# Patient Record
Sex: FEMALE | Race: WHITE | NOT HISPANIC OR LATINO | URBAN - METROPOLITAN AREA
[De-identification: names, ages, dates, MRNs, and addresses within clinical notes are randomized per-mention and may not be internally consistent; named-entity substitution may affect disease eponyms.]

---
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
## 2022-01-03 ENCOUNTER — EMERGENCY (EMERGENCY)
Facility: HOSPITAL | Age: 22
LOS: 1 days | Discharge: HOME | End: 2022-01-03
Attending: EMERGENCY MEDICINE | Admitting: EMERGENCY MEDICINE
Payer: COMMERCIAL

## 2022-01-03 VITALS
SYSTOLIC BLOOD PRESSURE: 113 MMHG | DIASTOLIC BLOOD PRESSURE: 80 MMHG | OXYGEN SATURATION: 97 % | WEIGHT: 100.09 LBS | HEART RATE: 120 BPM | RESPIRATION RATE: 16 BRPM | HEIGHT: 67 IN | TEMPERATURE: 98 F

## 2022-01-03 VITALS
SYSTOLIC BLOOD PRESSURE: 123 MMHG | TEMPERATURE: 98 F | DIASTOLIC BLOOD PRESSURE: 75 MMHG | HEART RATE: 101 BPM | OXYGEN SATURATION: 97 % | RESPIRATION RATE: 16 BRPM

## 2022-01-03 LAB
ALBUMIN SERPL ELPH-MCNC: 4 G/DL — SIGNIFICANT CHANGE UP (ref 3.3–5)
ALP SERPL-CCNC: 114 U/L — SIGNIFICANT CHANGE UP (ref 30–120)
ALT FLD-CCNC: 18 U/L DA — SIGNIFICANT CHANGE UP (ref 10–60)
ANION GAP SERPL CALC-SCNC: 12 MMOL/L — SIGNIFICANT CHANGE UP (ref 5–17)
AST SERPL-CCNC: 13 U/L — SIGNIFICANT CHANGE UP (ref 10–40)
BASOPHILS # BLD AUTO: 0.06 K/UL — SIGNIFICANT CHANGE UP (ref 0–0.2)
BASOPHILS NFR BLD AUTO: 0.5 % — SIGNIFICANT CHANGE UP (ref 0–2)
BILIRUB SERPL-MCNC: 0.3 MG/DL — SIGNIFICANT CHANGE UP (ref 0.2–1.2)
BUN SERPL-MCNC: 20 MG/DL — SIGNIFICANT CHANGE UP (ref 7–23)
CALCIUM SERPL-MCNC: 9.4 MG/DL — SIGNIFICANT CHANGE UP (ref 8.4–10.5)
CHLORIDE SERPL-SCNC: 100 MMOL/L — SIGNIFICANT CHANGE UP (ref 96–108)
CO2 SERPL-SCNC: 28 MMOL/L — SIGNIFICANT CHANGE UP (ref 22–31)
CREAT SERPL-MCNC: 0.83 MG/DL — SIGNIFICANT CHANGE UP (ref 0.5–1.3)
EOSINOPHIL # BLD AUTO: 0.01 K/UL — SIGNIFICANT CHANGE UP (ref 0–0.5)
EOSINOPHIL NFR BLD AUTO: 0.1 % — SIGNIFICANT CHANGE UP (ref 0–6)
GLUCOSE SERPL-MCNC: 91 MG/DL — SIGNIFICANT CHANGE UP (ref 70–99)
HCG UR QL: NEGATIVE — SIGNIFICANT CHANGE UP
HCT VFR BLD CALC: 40.6 % — SIGNIFICANT CHANGE UP (ref 34.5–45)
HGB BLD-MCNC: 13.4 G/DL — SIGNIFICANT CHANGE UP (ref 11.5–15.5)
IMM GRANULOCYTES NFR BLD AUTO: 0.3 % — SIGNIFICANT CHANGE UP (ref 0–1.5)
LIDOCAIN IGE QN: 66 U/L — LOW (ref 73–393)
LYMPHOCYTES # BLD AUTO: 1.04 K/UL — SIGNIFICANT CHANGE UP (ref 1–3.3)
LYMPHOCYTES # BLD AUTO: 8.9 % — LOW (ref 13–44)
MCHC RBC-ENTMCNC: 31.3 PG — SIGNIFICANT CHANGE UP (ref 27–34)
MCHC RBC-ENTMCNC: 33 GM/DL — SIGNIFICANT CHANGE UP (ref 32–36)
MCV RBC AUTO: 94.9 FL — SIGNIFICANT CHANGE UP (ref 80–100)
MONOCYTES # BLD AUTO: 0.91 K/UL — HIGH (ref 0–0.9)
MONOCYTES NFR BLD AUTO: 7.8 % — SIGNIFICANT CHANGE UP (ref 2–14)
NEUTROPHILS # BLD AUTO: 9.57 K/UL — HIGH (ref 1.8–7.4)
NEUTROPHILS NFR BLD AUTO: 82.4 % — HIGH (ref 43–77)
NRBC # BLD: 0 /100 WBCS — SIGNIFICANT CHANGE UP (ref 0–0)
PLATELET # BLD AUTO: 392 K/UL — SIGNIFICANT CHANGE UP (ref 150–400)
POTASSIUM SERPL-MCNC: 3.9 MMOL/L — SIGNIFICANT CHANGE UP (ref 3.5–5.3)
POTASSIUM SERPL-SCNC: 3.9 MMOL/L — SIGNIFICANT CHANGE UP (ref 3.5–5.3)
PROT SERPL-MCNC: 8.2 G/DL — SIGNIFICANT CHANGE UP (ref 6–8.3)
RBC # BLD: 4.28 M/UL — SIGNIFICANT CHANGE UP (ref 3.8–5.2)
RBC # FLD: 12.7 % — SIGNIFICANT CHANGE UP (ref 10.3–14.5)
SARS-COV-2 RNA SPEC QL NAA+PROBE: DETECTED
SODIUM SERPL-SCNC: 140 MMOL/L — SIGNIFICANT CHANGE UP (ref 135–145)
WBC # BLD: 11.63 K/UL — HIGH (ref 3.8–10.5)
WBC # FLD AUTO: 11.63 K/UL — HIGH (ref 3.8–10.5)

## 2022-01-03 PROCEDURE — 36415 COLL VENOUS BLD VENIPUNCTURE: CPT

## 2022-01-03 PROCEDURE — 99284 EMERGENCY DEPT VISIT MOD MDM: CPT

## 2022-01-03 PROCEDURE — 81025 URINE PREGNANCY TEST: CPT

## 2022-01-03 PROCEDURE — 99284 EMERGENCY DEPT VISIT MOD MDM: CPT | Mod: 25

## 2022-01-03 PROCEDURE — 96374 THER/PROPH/DIAG INJ IV PUSH: CPT

## 2022-01-03 PROCEDURE — 80053 COMPREHEN METABOLIC PANEL: CPT

## 2022-01-03 PROCEDURE — 83690 ASSAY OF LIPASE: CPT

## 2022-01-03 PROCEDURE — 85025 COMPLETE CBC W/AUTO DIFF WBC: CPT

## 2022-01-03 PROCEDURE — 87635 SARS-COV-2 COVID-19 AMP PRB: CPT

## 2022-01-03 RX ORDER — SODIUM CHLORIDE 9 MG/ML
1000 INJECTION INTRAMUSCULAR; INTRAVENOUS; SUBCUTANEOUS ONCE
Refills: 0 | Status: COMPLETED | OUTPATIENT
Start: 2022-01-03 | End: 2022-01-03

## 2022-01-03 RX ORDER — ONDANSETRON 8 MG/1
4 TABLET, FILM COATED ORAL ONCE
Refills: 0 | Status: COMPLETED | OUTPATIENT
Start: 2022-01-03 | End: 2022-01-03

## 2022-01-03 RX ORDER — ONDANSETRON 8 MG/1
1 TABLET, FILM COATED ORAL
Qty: 12 | Refills: 0
Start: 2022-01-03 | End: 2022-01-05

## 2022-01-03 RX ADMIN — ONDANSETRON 4 MILLIGRAM(S): 8 TABLET, FILM COATED ORAL at 17:43

## 2022-01-03 RX ADMIN — SODIUM CHLORIDE 1000 MILLILITER(S): 9 INJECTION INTRAMUSCULAR; INTRAVENOUS; SUBCUTANEOUS at 17:40

## 2022-01-03 RX ADMIN — SODIUM CHLORIDE 1000 MILLILITER(S): 9 INJECTION INTRAMUSCULAR; INTRAVENOUS; SUBCUTANEOUS at 17:30
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
## 2022-01-03 NOTE — ED PROVIDER NOTE - CARE PROVIDER_API CALL
Marga Mesa (DO)  Internal Medicine  [address] [place] [address]  Phone: [phone]  Fax: [phone]  Follow Up Time: 1-3 Days

## 2022-01-03 NOTE — ED PROVIDER NOTE - NSFOLLOWUPINSTRUCTIONS_ED_ALL_ED_FT
clear liquid diet next 8-12 hours, advance to BRAT diet as tolerated  zofran every 6 hours for nausea   follow up with primary care provider           Acute Nausea and Vomiting    WHAT YOU NEED TO KNOW:    Acute nausea and vomiting start suddenly, worsen quickly, and last a short time.    DISCHARGE INSTRUCTIONS:    Return to the emergency department if:   •You see blood in your vomit or your bowel movements.      •You have sudden, severe pain in your chest and upper abdomen after hard vomiting or retching.      •You have swelling in your neck and chest.       •You are dizzy, cold, and thirsty and your eyes and mouth are dry.      •You are urinating very little or not at all.      •You have muscle weakness, leg cramps, and trouble breathing.       •Your heart is beating much faster than normal.       •You continue to vomit for more than 48 hours.       Contact your healthcare provider if:   •You have frequent dry heaves (vomiting but nothing comes out).      •Your nausea and vomiting does not get better or go away after you use medicine.      •You have questions or concerns about your condition or treatment.      Medicines: You may need any of the following:   •Medicines may be given to calm your stomach and stop your vomiting. You may also need medicines to help you feel more relaxed or to stop nausea and vomiting caused by motion sickness.      •Gastrointestinal stimulants are used to help empty your stomach and bowels. This may help decrease nausea and vomiting.      •Take your medicine as directed. Contact your healthcare provider if you think your medicine is not helping or if you have side effects. Tell him or her if you are allergic to any medicine. Keep a list of the medicines, vitamins, and herbs you take. Include the amounts, and when and why you take them. Bring the list or the pill bottles to follow-up visits. Carry your medicine list with you in case of an emergency.      Prevent or manage acute nausea and vomiting:   •Do not drink alcohol. Alcohol may upset or irritate your stomach. Too much alcohol can also cause acute nausea and vomiting.      •Control stress. Headaches due to stress may cause nausea and vomiting. Find ways to relax and manage your stress. Get more rest and sleep.      •Drink more liquids as directed. Vomiting can lead to dehydration. It is important to drink more liquids to help replace lost body fluids. Ask your healthcare provider how much liquid to drink each day and which liquids are best for you. Your provider may recommend that you drink an oral rehydration solution (ORS). ORS contains water, salts, and sugar that are needed to replace the lost body fluids. Ask what kind of ORS to use, how much to drink, and where to get it.      •Eat smaller meals, more often. Eat small amounts of food every 2 to 3 hours, even if you are not hungry. Food in your stomach may decrease your nausea.      •Talk to your healthcare provider before you take over-the-counter (OTC) medicines. These medicines can cause serious problems if you use certain other medicines, or you have a medical condition. You may have problems if you use too much or use them for longer than the label says. Follow directions on the label carefully.       Follow up with your healthcare provider as directed: Write down your questions so you remember to ask them during your follow-up visits.

## 2022-01-03 NOTE — ED PROVIDER NOTE - PROGRESS NOTE DETAILS
Ino JORDAN for ED attending, Dr. Knight: 22 y/o F w/ PMHx of depression, anxiety, asthma presents to ED c/o vomiting for 3 days. Pt reports she has lost 22 pound in the last 3-4 days. Pt states she cant keep any food or water down. Pt endorses abd pain. Denies fever, possibility of pregnancy. No BM today, last one 3 days ago. Pt reports she never has had this issue before. Pt states she had a fever a week ago but had negative RVP and strep pcr. PCP: Dr. Ford. On exam: vital signs normal, afebrile and in no distress, heart and lungs normal, abd soft nontender, no mass or hsm, no rebound, extremities full ROM intact, skin warm dry no rash. Impression is vomiting. Plan labs IV fluids and Zofran. Reevaluated patient at bedside.  Patient feeling much improved.  no abd tenderness on repeat exam. taking po fluids. repeat heart rate 100.  Discussed the results of all diagnostic testing in ED and copies of all reports given.  supportive care discussed.  An opportunity to ask questions was given.  Discussed the importance of prompt, close medical follow-up.  Patient will return with any changes, concerns or persistent / worsening symptoms.  Understanding of all instructions verbalized.

## 2022-01-03 NOTE — ED ADULT NURSE NOTE - OBJECTIVE STATEMENT
C/O Nausea and vomiting x 4 days that re causing her stomach to cramps. States she lost 22 pounds in a week. Also states she had a fever last week higher 101.2F

## 2022-01-03 NOTE — ED PROVIDER NOTE - OBJECTIVE STATEMENT
21 year old female with history of depression, anxiety, and asthma presents with nausea and vomiting past 3 days. reports vomiting over 10-20 times a day. unable to keep anything down. reports mild abd cramping after vomiting, but denies current abd pain. no diarrhea. reports fever for 1 day last week. was tested for covid, flu, and strep which was negative. no fever since last week. no cough, chest pain, shortness of breath. no urinary complaints. no covid vaccinated. no known covid exposures   PCP Marga riggs

## 2022-01-03 NOTE — ED PROVIDER NOTE - CLINICAL SUMMARY MEDICAL DECISION MAKING FREE TEXT BOX
vomiting 2-3 days, unable to keep anything down. fever last week. tested negative last week for covid, influenza, and strep. no abd tenderness to suggest appendicitis, cholecystitis, diverticulitis. suspect viral in nature. other differentials include but not limited to dehydration, covid, pancreatitis, electrolyte abnormality will check labs, IVF, zofran, reassess

## 2022-01-03 NOTE — ED PROVIDER NOTE - PATIENT PORTAL LINK FT
You can access the FollowMyHealth Patient Portal offered by [facility] by registering at the following website: [email or web address]. By joining [employer]’s FollowMyHealth portal, you will also be able to view your health information using other applications (apps) compatible with our system.

## 2022-01-06 ENCOUNTER — EMERGENCY (EMERGENCY)
Facility: HOSPITAL | Age: 22
LOS: 1 days | Discharge: HOME | End: 2022-01-06
Attending: EMERGENCY MEDICINE | Admitting: EMERGENCY MEDICINE
Payer: COMMERCIAL

## 2022-01-06 VITALS
TEMPERATURE: 99 F | SYSTOLIC BLOOD PRESSURE: 114 MMHG | OXYGEN SATURATION: 98 % | HEART RATE: 99 BPM | DIASTOLIC BLOOD PRESSURE: 68 MMHG | RESPIRATION RATE: 20 BRPM

## 2022-01-06 VITALS
DIASTOLIC BLOOD PRESSURE: 85 MMHG | WEIGHT: 103.84 LBS | OXYGEN SATURATION: 96 % | TEMPERATURE: 98 F | RESPIRATION RATE: 18 BRPM | HEIGHT: 67 IN | SYSTOLIC BLOOD PRESSURE: 135 MMHG | HEART RATE: 106 BPM

## 2022-01-06 LAB
ALBUMIN SERPL ELPH-MCNC: 3.8 G/DL — SIGNIFICANT CHANGE UP (ref 3.3–5)
ALP SERPL-CCNC: 106 U/L — SIGNIFICANT CHANGE UP (ref 30–120)
ALT FLD-CCNC: 41 U/L DA — SIGNIFICANT CHANGE UP (ref 10–60)
ANION GAP SERPL CALC-SCNC: 14 MMOL/L — SIGNIFICANT CHANGE UP (ref 5–17)
APPEARANCE UR: ABNORMAL
AST SERPL-CCNC: 41 U/L — HIGH (ref 10–40)
BACTERIA # UR AUTO: ABNORMAL
BILIRUB SERPL-MCNC: 0.2 MG/DL — SIGNIFICANT CHANGE UP (ref 0.2–1.2)
BILIRUB UR-MCNC: ABNORMAL
BUN SERPL-MCNC: 11 MG/DL — SIGNIFICANT CHANGE UP (ref 7–23)
CALCIUM SERPL-MCNC: 9.2 MG/DL — SIGNIFICANT CHANGE UP (ref 8.4–10.5)
CHLORIDE SERPL-SCNC: 101 MMOL/L — SIGNIFICANT CHANGE UP (ref 96–108)
CO2 SERPL-SCNC: 26 MMOL/L — SIGNIFICANT CHANGE UP (ref 22–31)
COLOR SPEC: YELLOW — SIGNIFICANT CHANGE UP
CREAT SERPL-MCNC: 0.89 MG/DL — SIGNIFICANT CHANGE UP (ref 0.5–1.3)
DIFF PNL FLD: ABNORMAL
EPI CELLS # UR: ABNORMAL
GLUCOSE SERPL-MCNC: 103 MG/DL — HIGH (ref 70–99)
GLUCOSE UR QL: NEGATIVE MG/DL — SIGNIFICANT CHANGE UP
HCG UR QL: NEGATIVE — SIGNIFICANT CHANGE UP
HCT VFR BLD CALC: 43 % — SIGNIFICANT CHANGE UP (ref 34.5–45)
HGB BLD-MCNC: 14.1 G/DL — SIGNIFICANT CHANGE UP (ref 11.5–15.5)
KETONES UR-MCNC: ABNORMAL
LEUKOCYTE ESTERASE UR-ACNC: ABNORMAL
LIDOCAIN IGE QN: 120 U/L — SIGNIFICANT CHANGE UP (ref 73–393)
MCHC RBC-ENTMCNC: 30.9 PG — SIGNIFICANT CHANGE UP (ref 27–34)
MCHC RBC-ENTMCNC: 32.8 GM/DL — SIGNIFICANT CHANGE UP (ref 32–36)
MCV RBC AUTO: 94.1 FL — SIGNIFICANT CHANGE UP (ref 80–100)
NITRITE UR-MCNC: NEGATIVE — SIGNIFICANT CHANGE UP
NRBC # BLD: 0 /100 WBCS — SIGNIFICANT CHANGE UP (ref 0–0)
PH UR: 5 — SIGNIFICANT CHANGE UP (ref 5–8)
PLATELET # BLD AUTO: 353 K/UL — SIGNIFICANT CHANGE UP (ref 150–400)
POTASSIUM SERPL-MCNC: 3.4 MMOL/L — LOW (ref 3.5–5.3)
POTASSIUM SERPL-SCNC: 3.4 MMOL/L — LOW (ref 3.5–5.3)
PROT SERPL-MCNC: 7.8 G/DL — SIGNIFICANT CHANGE UP (ref 6–8.3)
PROT UR-MCNC: 100 MG/DL
RBC # BLD: 4.57 M/UL — SIGNIFICANT CHANGE UP (ref 3.8–5.2)
RBC # FLD: 12.8 % — SIGNIFICANT CHANGE UP (ref 10.3–14.5)
RBC CASTS # UR COMP ASSIST: SIGNIFICANT CHANGE UP /HPF (ref 0–4)
SODIUM SERPL-SCNC: 141 MMOL/L — SIGNIFICANT CHANGE UP (ref 135–145)
SP GR SPEC: 1.02 — SIGNIFICANT CHANGE UP (ref 1.01–1.02)
UROBILINOGEN FLD QL: 1 MG/DL
WBC # BLD: 8.5 K/UL — SIGNIFICANT CHANGE UP (ref 3.8–10.5)
WBC # FLD AUTO: 8.5 K/UL — SIGNIFICANT CHANGE UP (ref 3.8–10.5)
WBC UR QL: SIGNIFICANT CHANGE UP

## 2022-01-06 PROCEDURE — 99284 EMERGENCY DEPT VISIT MOD MDM: CPT

## 2022-01-06 PROCEDURE — 80053 COMPREHEN METABOLIC PANEL: CPT

## 2022-01-06 PROCEDURE — 99283 EMERGENCY DEPT VISIT LOW MDM: CPT

## 2022-01-06 PROCEDURE — 85027 COMPLETE CBC AUTOMATED: CPT

## 2022-01-06 PROCEDURE — 83690 ASSAY OF LIPASE: CPT

## 2022-01-06 PROCEDURE — 36415 COLL VENOUS BLD VENIPUNCTURE: CPT

## 2022-01-06 PROCEDURE — 81001 URINALYSIS AUTO W/SCOPE: CPT

## 2022-01-06 PROCEDURE — 81025 URINE PREGNANCY TEST: CPT

## 2022-01-06 RX ORDER — LAMOTRIGINE 25 MG/1
0 TABLET, ORALLY DISINTEGRATING ORAL
Qty: 0 | Refills: 0 | DISCHARGE

## 2022-01-06 RX ORDER — QUETIAPINE FUMARATE 200 MG/1
1 TABLET, FILM COATED ORAL
Qty: 0 | Refills: 0 | DISCHARGE

## 2022-01-06 RX ORDER — SERTRALINE 25 MG/1
1 TABLET, FILM COATED ORAL
Qty: 0 | Refills: 0 | DISCHARGE

## 2022-01-06 RX ORDER — QUETIAPINE FUMARATE 200 MG/1
0 TABLET, FILM COATED ORAL
Qty: 0 | Refills: 0 | DISCHARGE

## 2022-01-06 RX ORDER — SERTRALINE 25 MG/1
150 TABLET, FILM COATED ORAL
Qty: 0 | Refills: 0 | DISCHARGE

## 2022-01-06 RX ORDER — SODIUM CHLORIDE 9 MG/ML
1000 INJECTION INTRAMUSCULAR; INTRAVENOUS; SUBCUTANEOUS ONCE
Refills: 0 | Status: COMPLETED | OUTPATIENT
Start: 2022-01-06 | End: 2022-01-06

## 2022-01-06 RX ADMIN — SODIUM CHLORIDE 1000 MILLILITER(S): 9 INJECTION INTRAMUSCULAR; INTRAVENOUS; SUBCUTANEOUS at 04:20

## 2022-01-06 NOTE — ED PROVIDER NOTE - PATIENT PORTAL LINK FT
You can access the FollowMyHealth Patient Portal offered by [facility] by registering at the following website: [email or web address]. By joining [employer]’s FollowMyHealth portal, you will also be able to view your health information using other applications (apps) compatible with our system.

## 2022-01-06 NOTE — ED PROVIDER NOTE - OBJECTIVE STATEMENT
Patient seen here 2-3 days ago with n/v/d. Diagnosed with covid, Treated and released with zofran as out-patient. Now states the n/v/d have continued. She states she is unable to keep anything down, and feels like she could pass out. Using zofran po at home, but not helping. Some cough. No SOB. No urinary symptoms

## 2022-01-06 NOTE — ED PROVIDER NOTE - NSFOLLOWUPINSTRUCTIONS_ED_ALL_ED_FT
COVID-19 (Coronavirus Disease 2019)    WHAT YOU NEED TO KNOW:    What do I need to know about COVID-19? COVID-19 is the disease caused by a coronavirus first discovered in December 2019. Coronaviruses generally cause upper respiratory (nose, throat, and lung) infections, such as a cold. The 2019 virus spreads quickly and easily. It can be spread starting 2 to 3 days before symptoms even begin.    What do I need to know about variants? The virus has changed into several new forms (called variants) since it was discovered. The variants may be more contagious (easily spread) than the original form. Some may also cause more severe illness than others.    What are the signs and symptoms of COVID-19? You may not develop any signs or symptoms. Signs and symptoms usually start about 5 days after infection but can take 2 to 14 days. You may feel like you have the flu or a bad cold. Some signs and symptoms go away in a few days. Others can last weeks, months, or possibly years. You may have any of the following:   •A cough      •Shortness of breath or trouble breathing that may become severe      •A fever      •Chills that might include shaking      •Muscle pain, body aches, or a headache      •A sore throat      •Sudden changes or loss of your taste or smell      •Feeling mentally and physically tired (fatigue)      •Congestion (stuffy head and nose), or a runny nose      •Diarrhea, nausea, or vomiting      How is COVID-19 diagnosed? Testing is offered at many sites. You may need to quarantine until you get your results. Any of the following tests may be used:   •A viral PCR test shows if you have a current infection. A sample is taken from your nose or throat with a swab. You may need to wait 1 or more days to get the test results.       •An antigen test shows if you have a protein from the COVID-19 virus. This test is often called a rapid test because the results can be available in 30 minutes or less.      •An antibody test shows if you had a recent or past infection. Blood samples are used for this test. Antibodies are made by your immune system to fight the virus that causes COVID-19. Antibodies form 1 to 3 weeks after you are infected. This test is not used to show if you are immune to the virus.       •A CT, MRI, ultrasound, or x-ray may be used to check for complications of COVID-19. These may include pneumonia, blood clots, or other complications.      How is COVID-19 treated?   •Mild symptoms may get better on their own. Some treatments have emergency use authorization (EUA). Examples include monoclonal antibodies and convalescent plasma. These may be given to help prevent worsening of your symptoms. You may also need any of the following:?Decongestants help reduce nasal congestion and help you breathe more easily. If you take decongestant pills, they may make you feel restless or cause problems with your sleep. Do not use decongestant sprays for more than a few days.      ?Cough suppressants help reduce coughing. Ask your healthcare provider which type of cough medicine is best for you.      ?To soothe a sore throat, gargle with warm salt water, or use throat lozenges or a throat spray. Drink more liquids to thin and loosen mucus and to prevent dehydration.      ?NSAIDs or acetaminophen can help lower a fever and relieve body aches or a headache. Follow directions. If not taken correctly, NSAIDs can cause kidney damage and acetaminophen can cause liver damage.      •Severe or life-threatening symptoms are treated in the hospital. You may need any of the following: ?Medicines may be given to fight the virus or treat inflammation.      ?Blood thinners help prevent or treat blood clots. If you have a deep vein thrombosis (DVT) or pulmonary embolism (PE), you may need to use blood thinners for at least 3 months.      ?Extra oxygen may be given if you have respiratory failure. This means your lungs cannot get enough oxygen into your blood and out to your organs.      ?A ventilator may be used to help you breathe.        What do I need to know about health problems the virus may cause? You may develop long-term health problems caused by the virus. Your risk is higher if you are 65 or older. A weak immune system, obesity, diabetes, chronic kidney disease, or a heart or lung condition can also increase your risk. Your risk is also higher if you are a current or former cigarette smoker. COVID-19 can lead to any of the following:  •Multisymptom inflammatory syndrome in adults (MIS-A) or in children (MIS-C), causing inflammation in the heart, digestive system, skin, or brain      •Shortness of breath, serious lower respiratory conditions, such as pneumonia or acute respiratory distress syndrome (ARDS)      •Blood clots or blood vessel damage      •Organ damage from a lack of oxygen or from blood clots      •Sleep problems      •Problems thinking clearly, remembering information, or concentrating      •Mood changes, depression, or anxiety      •Long-term problems tasting or smelling      •Loss of appetite and weight loss      •Nerve pain      •Fatigue (feeling mentally and physically tired)      How does the 2019 coronavirus spread?   •Droplets are the main way all coronaviruses spread. The virus travels in droplets that form when a person talks, sings, coughs, or sneezes. The droplets can also float in the air for minutes or hours. Infection happens when you breathe in the droplets or get them in your eyes or nose. Close personal contact with an infected person increases your risk for infection. This means being within 6 feet (2 meters) of the person for at least 15 minutes over 24 hours.      •Person-to-person contact can spread the virus. For example, a person with the virus on his or her hands can spread it by shaking hands with someone.      •The virus can stay on objects and surfaces for up to 3 days. You may become infected by touching the object or surface and then touching your eyes or mouth.      What do I need to know about COVID-19 vaccines? Healthcare providers recommend a COVID-19 vaccine, even if you have already had COVID-19. You are considered fully vaccinated against COVID-19 two weeks after the final dose of any COVID-19 vaccine. Let your healthcare provider know when you have received the final dose of the vaccine. Make a copy of your vaccination card. Keep the original with you in case you need to show it. Keep the copy in a safe place.  •COVID-19 vaccines are given as a shot in 1 or 2 doses.   Vaccination is recommended for everyone 5 years or older. One 2-dose vaccine is fully approved for those 16 or older. This vaccine also has an emergency use authorization (EUA) for children 5 to 15 years old. No vaccine is currently available for children younger than 5 years. An additional (booster) dose is also recommended for all adults 18 or older. The booster can be a different brand of the COVID-19 vaccine than you originally received. The timing for the booster depends on the type of vaccine you received:?1-dose vaccine: The booster is given at least 2 months after you received the vaccine.      ?2-dose vaccine: The booster is given at least 6 months after the second dose.    COVID-19 Immunization Schedule         •Continue social distancing and other measures, even after you get the vaccine. Although it is not common, you can become infected after you get the vaccine. You may also be able to pass the virus to others without knowing you are infected.      •After you get the vaccine, check local, national, and international travel rules. You may need to be tested before you travel. Some countries require proof of a negative test before you travel. You may also need to quarantine after you return.      How can I help lower the risk for COVID-19?   •Wash your hands often throughout the day. Use soap and water. Rub your soapy hands together, lacing your fingers, for at least 20 seconds. Rinse with warm, running water. Dry your hands with a clean towel or paper towel. Use hand  that contains alcohol if soap and water are not available. Teach children how to wash their hands and use hand .  Handwashing           •Cover sneezes and coughs. Turn your face away and cover your mouth and nose with a tissue. Throw the tissue away. Use the bend of your arm if a tissue is not available. Then wash your hands well with soap and water or use hand . Teach children how to cover a cough or sneeze.      •Wear a face covering (mask) when needed. Use a cloth covering with at least 2 layers. You can also create layers by putting a cloth covering over a disposable non-medical mask. Cover your mouth and your nose.  How to Wear a Face Covering (Mask)           •Follow worldwide, national, and local social distancing guidelines. Keep at least 6 feet (2 meters) between you and others.      •Try not to touch your face. If you get the virus on your hands, you can transfer it to your eyes, nose, or mouth and become infected. You can also transfer it to objects, surfaces, or people.      •Clean and disinfect high-touch surfaces and objects often. Use disinfecting wipes, or make a solution of 4 teaspoons of bleach in 1 quart (4 cups) of water.      •Ask about other vaccines you may need. Get the influenza (flu) vaccine as soon as recommended each year, usually starting in September or October. Get the pneumonia vaccine if recommended. Your healthcare provider can tell you if you should also get other vaccines, and when to get them.    Prevent COVID-19 Infection         How do I follow social distancing guidelines to help lower the risk for COVID-19? National and local social distancing rules vary. Rules and restrictions may change over time as restrictions are lifted. The following are general guidelines:   •Stay home if you are sick or think you may have COVID-19. It is important to stay home if you are waiting for a testing appointment or for test results.      •Avoid close physical contact with anyone who does not live in your home. Do not shake hands with, hug, or kiss a person as a greeting. If you must use public transportation (such as a bus or subway), try to sit or stand away from others. Wear your face covering.      •Avoid in-person gatherings and crowds. Attend virtually if possible.      Where can I find more information?   •Centers for Disease Control and Prevention  [address] [place] [address]  Phone: 1-718.390.3789  Web Address: http://www.cdc.gov        Call your local emergency number (911 in the [place]) if:   •You have trouble breathing or shortness of breath at rest.      •You have chest pain or pressure that lasts longer than 5 minutes.      •You become confused or hard to wake.      •Your lips or face are blue.      When should I seek immediate care?   •You have a fever of 104°F (40°C) or higher.          When should I call my doctor?   •You have symptoms of COVID-19.      •You have questions or concerns about your condition or care.      CARE AGREEMENT:    You have the right to help plan your care. Learn about your health condition and how it may be treated. Discuss treatment options with your healthcare providers to decide what care you want to receive. You always have the right to refuse treatment.

## 2022-01-06 NOTE — ED ADULT NURSE NOTE - OBJECTIVE STATEMENT
pt reports she was diagnosed 3 days ago with COVID. has been having n/v/d for the past 3 days. unable to keep food down. feels dehydrated

## 2022-01-06 NOTE — ED ADULT NURSE NOTE - NSICDXPASTMEDICALHX_GEN_ALL_CORE_FT
PAST MEDICAL HISTORY:  Anxiety     Asthma     Depression     H/O bipolar disorder     Schizophrenia, schizo-affective

## 2022-02-08 NOTE — ED PROVIDER NOTE - EYES, MLM
Patient was seen and evaluated at bedside. No acute events overnight. Pt offers no new complaints at this time. Underwent HD and Permacath removal yesterday. VS reviewed.    Vital Signs Last 24 Hrs  T(C): 36.9 (07 Feb 2022 19:46), Max: 37.5 (07 Feb 2022 16:46)  T(F): 98.5 (07 Feb 2022 19:46), Max: 99.5 (07 Feb 2022 16:46)  HR: 108 (07 Feb 2022 19:46) (89 - 110)  BP: 126/87 (07 Feb 2022 19:46) (126/83 - 139/88)  BP(mean): --  RR: 18 (07 Feb 2022 19:46) (16 - 18)  SpO2: 100% (07 Feb 2022 19:46) (97% - 100%)    Physical Exam:  General: AAOx3, in NAD  HEENT: NC/AT, EOMI, poor dentition  Cardio: S1S2, tachycardic  Pulm: equal air entry b/l, non labored  Chest: dressing over right chest c/d/i  Abdomen: soft, NT/ND  Extremities: multiple excoriations over all extremities    I&O's Summary    I&O's Detail      MEDICATIONS  (STANDING):  apixaban 2.5 milliGRAM(s) Oral two times a day  artificial tears (preservative free) Ophthalmic Solution 1 Drop(s) Both EYES two times a day  atovaquone  Suspension 1500 milliGRAM(s) Oral daily  collagenase Ointment 1 Application(s) Topical daily  epoetin amee-epbx (RETACRIT) Injectable 81981 Unit(s) IV Push <User Schedule>  gabapentin 100 milliGRAM(s) Oral three times a day  hydroxychloroquine 200 milliGRAM(s) Oral daily  levETIRAcetam 250 milliGRAM(s) Oral <User Schedule>  levETIRAcetam 500 milliGRAM(s) Oral daily  metoprolol tartrate 12.5 milliGRAM(s) Oral two times a day  pantoprazole    Tablet 40 milliGRAM(s) Oral before breakfast  sevelamer carbonate 800 milliGRAM(s) Oral three times a day with meals  silver sulfADIAZINE 1% Cream 1 Application(s) Topical two times a day    MEDICATIONS  (PRN):  acetaminophen     Tablet .. 650 milliGRAM(s) Oral every 6 hours PRN Temp greater or equal to 38.5C (101.3F), Mild Pain (1 - 3)  albumin human 25% IVPB 50 milliLiter(s) IV Intermittent every 1 hour PRN SBP less than 100  ALBUTerol    90 MICROgram(s) HFA Inhaler 2 Puff(s) Inhalation every 6 hours PRN Shortness of Breath and/or Wheezing  diphenhydrAMINE 25 milliGRAM(s) Oral every 6 hours PRN Rash and/or Itching  loperamide 2 milliGRAM(s) Oral every 6 hours PRN Diarrhea  melatonin 3 milliGRAM(s) Oral at bedtime PRN Insomnia  senna 2 Tablet(s) Oral at bedtime PRN Constipation      LABS:                        7.5    10.77 )-----------( 174      ( 07 Feb 2022 11:57 )             23.4     02-07    130<L>  |  99  |  18  ----------------------------<  84  3.1<L>   |  20<L>  |  4.88<H>    Ca    8.5      07 Feb 2022 07:49            RADIOLOGY & ADDITIONAL STUDIES: Clear bilaterally, pupils equal, round and reactive to light.

## 2022-09-23 ENCOUNTER — NON-APPOINTMENT (OUTPATIENT)
Age: 22
End: 2022-09-23

## 2022-10-24 NOTE — ED ADULT NURSE NOTE - RESPIRATION RHYTHM, QM
10/24/2022    Patient: Jorge Glover   YOB: 1942   Date of Visit: 10/24/2022     Jeff Cain MD  [address] [place] [address]  [facility] [phone]  Via Fax: [phone]    Dear Jeff Cain MD,      Thank you for referring Mr. Jorge Glover to [facility] for evaluation. My notes for this consultation are attached. If you have questions, please do not hesitate to call me. I look forward to following your patient along with you.       Sincerely,    Wally Cruz MD regular

## 2022-11-15 ENCOUNTER — NON-APPOINTMENT (OUTPATIENT)
Age: 22
End: 2022-11-15

## 2022-11-16 ENCOUNTER — EMERGENCY (EMERGENCY)
Facility: HOSPITAL | Age: 22
LOS: 1 days | Discharge: LEFT BEFORE DISCHARGE | End: 2022-11-16
Admitting: EMERGENCY MEDICINE

## 2022-11-16 VITALS
WEIGHT: 119.93 LBS | SYSTOLIC BLOOD PRESSURE: 110 MMHG | DIASTOLIC BLOOD PRESSURE: 76 MMHG | HEIGHT: 67 IN | RESPIRATION RATE: 18 BRPM | HEART RATE: 102 BPM | TEMPERATURE: 98 F | OXYGEN SATURATION: 99 %

## 2022-11-16 VITALS
SYSTOLIC BLOOD PRESSURE: 112 MMHG | HEART RATE: 88 BPM | OXYGEN SATURATION: 99 % | DIASTOLIC BLOOD PRESSURE: 74 MMHG | RESPIRATION RATE: 18 BRPM

## 2022-11-16 PROCEDURE — L9992: CPT

## 2022-11-17 PROBLEM — F25.9 SCHIZOAFFECTIVE DISORDER, UNSPECIFIED: Chronic | Status: ACTIVE | Noted: 2022-01-06

## 2022-11-17 PROBLEM — Z86.59 PERSONAL HISTORY OF OTHER MENTAL AND BEHAVIORAL DISORDERS: Chronic | Status: ACTIVE | Noted: 2022-01-06

## 2022-11-21 ENCOUNTER — EMERGENCY (EMERGENCY)
Facility: HOSPITAL | Age: 22
LOS: 1 days | Discharge: HOME | End: 2022-11-21
Attending: EMERGENCY MEDICINE | Admitting: EMERGENCY MEDICINE
Payer: COMMERCIAL

## 2022-11-21 VITALS
SYSTOLIC BLOOD PRESSURE: 119 MMHG | OXYGEN SATURATION: 99 % | DIASTOLIC BLOOD PRESSURE: 77 MMHG | HEIGHT: 67 IN | TEMPERATURE: 98 F | WEIGHT: 125 LBS | HEART RATE: 104 BPM | RESPIRATION RATE: 18 BRPM

## 2022-11-21 VITALS
TEMPERATURE: 98 F | OXYGEN SATURATION: 99 % | RESPIRATION RATE: 16 BRPM | SYSTOLIC BLOOD PRESSURE: 110 MMHG | DIASTOLIC BLOOD PRESSURE: 81 MMHG | HEART RATE: 88 BPM

## 2022-11-21 LAB
ALBUMIN SERPL ELPH-MCNC: 3.9 G/DL — SIGNIFICANT CHANGE UP (ref 3.3–5)
ALP SERPL-CCNC: 79 U/L — SIGNIFICANT CHANGE UP (ref 40–120)
ALT FLD-CCNC: 48 U/L — SIGNIFICANT CHANGE UP (ref 12–78)
ANION GAP SERPL CALC-SCNC: 7 MMOL/L — SIGNIFICANT CHANGE UP (ref 5–17)
AST SERPL-CCNC: 33 U/L — SIGNIFICANT CHANGE UP (ref 15–37)
BASOPHILS # BLD AUTO: 0.03 K/UL — SIGNIFICANT CHANGE UP (ref 0–0.2)
BASOPHILS NFR BLD AUTO: 0.7 % — SIGNIFICANT CHANGE UP (ref 0–2)
BILIRUB SERPL-MCNC: 0.4 MG/DL — SIGNIFICANT CHANGE UP (ref 0.2–1.2)
BUN SERPL-MCNC: 11 MG/DL — SIGNIFICANT CHANGE UP (ref 7–23)
CALCIUM SERPL-MCNC: 9.5 MG/DL — SIGNIFICANT CHANGE UP (ref 8.5–10.1)
CHLORIDE SERPL-SCNC: 107 MMOL/L — SIGNIFICANT CHANGE UP (ref 96–108)
CO2 SERPL-SCNC: 29 MMOL/L — SIGNIFICANT CHANGE UP (ref 22–31)
CREAT SERPL-MCNC: 0.87 MG/DL — SIGNIFICANT CHANGE UP (ref 0.5–1.3)
D DIMER BLD IA.RAPID-MCNC: <150 NG/ML DDU — SIGNIFICANT CHANGE UP
EGFR: 98 ML/MIN/1.73M2 — SIGNIFICANT CHANGE UP
EOSINOPHIL # BLD AUTO: 0.14 K/UL — SIGNIFICANT CHANGE UP (ref 0–0.5)
EOSINOPHIL NFR BLD AUTO: 3.5 % — SIGNIFICANT CHANGE UP (ref 0–6)
GLUCOSE SERPL-MCNC: 87 MG/DL — SIGNIFICANT CHANGE UP (ref 70–99)
HCT VFR BLD CALC: 40.5 % — SIGNIFICANT CHANGE UP (ref 34.5–45)
HGB BLD-MCNC: 13.4 G/DL — SIGNIFICANT CHANGE UP (ref 11.5–15.5)
IMM GRANULOCYTES NFR BLD AUTO: 0.2 % — SIGNIFICANT CHANGE UP (ref 0–0.9)
LYMPHOCYTES # BLD AUTO: 1.59 K/UL — SIGNIFICANT CHANGE UP (ref 1–3.3)
LYMPHOCYTES # BLD AUTO: 39.3 % — SIGNIFICANT CHANGE UP (ref 13–44)
MCHC RBC-ENTMCNC: 30.7 PG — SIGNIFICANT CHANGE UP (ref 27–34)
MCHC RBC-ENTMCNC: 33.1 GM/DL — SIGNIFICANT CHANGE UP (ref 32–36)
MCV RBC AUTO: 92.7 FL — SIGNIFICANT CHANGE UP (ref 80–100)
MONOCYTES # BLD AUTO: 0.4 K/UL — SIGNIFICANT CHANGE UP (ref 0–0.9)
MONOCYTES NFR BLD AUTO: 9.9 % — SIGNIFICANT CHANGE UP (ref 2–14)
NEUTROPHILS # BLD AUTO: 1.88 K/UL — SIGNIFICANT CHANGE UP (ref 1.8–7.4)
NEUTROPHILS NFR BLD AUTO: 46.4 % — SIGNIFICANT CHANGE UP (ref 43–77)
NRBC # BLD: 0 /100 WBCS — SIGNIFICANT CHANGE UP (ref 0–0)
PLATELET # BLD AUTO: 240 K/UL — SIGNIFICANT CHANGE UP (ref 150–400)
POTASSIUM SERPL-MCNC: 3.6 MMOL/L — SIGNIFICANT CHANGE UP (ref 3.5–5.3)
POTASSIUM SERPL-SCNC: 3.6 MMOL/L — SIGNIFICANT CHANGE UP (ref 3.5–5.3)
PROT SERPL-MCNC: 7 G/DL — SIGNIFICANT CHANGE UP (ref 6–8.3)
RBC # BLD: 4.37 M/UL — SIGNIFICANT CHANGE UP (ref 3.8–5.2)
RBC # FLD: 12.3 % — SIGNIFICANT CHANGE UP (ref 10.3–14.5)
SODIUM SERPL-SCNC: 143 MMOL/L — SIGNIFICANT CHANGE UP (ref 135–145)
T4 AB SER-ACNC: 4.7 UG/DL — SIGNIFICANT CHANGE UP (ref 4.6–12)
TSH SERPL-MCNC: 3.43 UIU/ML — SIGNIFICANT CHANGE UP (ref 0.36–3.74)
WBC # BLD: 4.05 K/UL — SIGNIFICANT CHANGE UP (ref 3.8–10.5)
WBC # FLD AUTO: 4.05 K/UL — SIGNIFICANT CHANGE UP (ref 3.8–10.5)

## 2022-11-21 PROCEDURE — 85025 COMPLETE CBC W/AUTO DIFF WBC: CPT

## 2022-11-21 PROCEDURE — 85379 FIBRIN DEGRADATION QUANT: CPT

## 2022-11-21 PROCEDURE — 71045 X-RAY EXAM CHEST 1 VIEW: CPT

## 2022-11-21 PROCEDURE — 93010 ELECTROCARDIOGRAM REPORT: CPT

## 2022-11-21 PROCEDURE — 36415 COLL VENOUS BLD VENIPUNCTURE: CPT

## 2022-11-21 PROCEDURE — 71045 X-RAY EXAM CHEST 1 VIEW: CPT | Mod: 26

## 2022-11-21 PROCEDURE — 84436 ASSAY OF TOTAL THYROXINE: CPT

## 2022-11-21 PROCEDURE — 93005 ELECTROCARDIOGRAM TRACING: CPT

## 2022-11-21 PROCEDURE — 80053 COMPREHEN METABOLIC PANEL: CPT

## 2022-11-21 PROCEDURE — 99285 EMERGENCY DEPT VISIT HI MDM: CPT | Mod: 25

## 2022-11-21 PROCEDURE — 84443 ASSAY THYROID STIM HORMONE: CPT

## 2022-11-21 PROCEDURE — 99285 EMERGENCY DEPT VISIT HI MDM: CPT

## 2022-11-21 NOTE — ED PROVIDER NOTE - NS ED ROS FT
CONSTITUTIONAL: denies fever, chills, fatigue, weakness  HEENT: denies blurred vision, sore throat  SKIN: denies new lesions, rash  CARDIOVASCULAR: ADMITS palpitations with associated chest pressure and pain  RESPIRATORY: ADMITS shortness of breath, Denies cough, sputum production  GASTROINTESTINAL: ADMITS nausea; denies vomiting, diarrhea, abdominal pain, melena or hematochezia  NEUROLOGICAL: ADMITS mild headache, denies numbness, focal weakness  MUSCULOSKELETAL: denies new joint pain, muscle aches

## 2022-11-21 NOTE — ED PROVIDER NOTE - PROGRESS NOTE DETAILS
no arrhythmia no sx   Reevaluated patient at bedside.  Patient feeling much improved.  Discussed the results of all diagnostic testing in ED and copies of all reports given.   An opportunity to ask questions was given.  Discussed the importance of prompt, close medical follow-up.  Patient will return with any changes, concerns or persistent / worsening symptoms.  Understanding of all instructions verbalized. pt remains stable without any sx or recurrences eating snacks and using phone will dc to follow upw tih pmd and cardiology

## 2022-11-21 NOTE — ED PROVIDER NOTE - CLINICAL SUMMARY MEDICAL DECISION MAKING FREE TEXT BOX
Patient is a 22-year-old female with a history of bipolar disorder anxiety depression.  Who presented to the emergency room 4 days ago with a panic attack and palpitations.  She felt better after realizing was a panic attack and left without being seen.  Presents emergency room today with her friend, because while she was at work she began to feel palpitations.  She felt panicked with a rapid heartbeat lightheadedness and dizziness.  She denies any trauma or travel.  She denies any recent illness.  No fever.  Denies domestic violence.  Denies drug use.  Denies alcohol use, but is a smoker of smokeless tobacco.    On evaluation is a well-developed well-nourished female no apparent distress.  HEENT is normal, neck is supple heart and lung exam are normal.  Without murmur.  Abdominal exam soft nontender.  Musculoskeletal exam is normal.  Lymph exam is normal.  Skin exam is normal.  With the exception that she has a early forming keloid and reactive phenomena to a piercing to the left eyebrow.  Patient was a made aware of this finding, and states she will follow-up with her  to have her piercing removed as she was unable to do so.  Psychiatric patient is awake alert oriented and cooperative.  With normal mentation, normal speech, no evidence of homicidal suicidal ideation.    Plan of care includes rule out anemia rule out metabolic derangement, rule out PE, rule out pregnancy, rule out substance abuse, versus repeat panic attack.  QTC is not prolonged, EKG is similar to prior EKG, laboratory studies and x-ray.  We will continue to cardiac monitor and evaluation.

## 2022-11-21 NOTE — ED PROVIDER NOTE - NSTOBACCO TYPE_GEN_A_CORE_RD
fluticasone (FLOVENT HFA) 110 MCG/ACT inhaler 12 g 2 11/20/2018     Sig - Route: Inhale 2 puffs into the lungs 2 times daily. - Inhalation      Last appointment:11/20/2019    F/u appointment:11/11/2020    Last Refill:11/20/2018    Per last office visit:  DEPRESSION ASSESSMENT/PLAN:  Depression screening is negative no further plan needed.  ASSESSMENT: healthy 43 year old male.     PLAN:  Annual physical exam  (primary encounter diagnosis)  Comment:  Patient is a healthy 43-year-old male with no new negative findings.     Essential hypertension  Comment:  Blood pressure is well controlled on lisinopril 10 mg p.o..  Will continue this.  CBC and CMP will be drawn today, will call the patient with these results.  Plan: lisinopril (ZESTRIL) 10 MG tablet, CBC WITH         DIFFERENTIAL, COMPREHENSIVE METABOLIC PANEL             Need for vaccination  Comment:  Patient is agreeable for flu vaccine today.  Plan: INFLUENZA QUADRIVALENT SPLIT PRES FREE 0.5 ML         VACC, IM (FLULAVAL,FLUARIX,FLUZONE)             Screening cholesterol level  Comment:  We will check a cholesterol level today.  Will call the patient with these results.  Plan: LIPID PANEL WITH REFLEX      Refilled per [facility] refill protocol.    Vaping/e-Cigarettes

## 2022-11-21 NOTE — ED ADULT TRIAGE NOTE - CHIEF COMPLAINT QUOTE
"I have been having palpitations for about 1 week" Pt states she had worsening left sided CP and SOB today. Pt in no acute distress at this time. Pt denies fever or chills.

## 2022-11-21 NOTE — ED PROVIDER NOTE - PATIENT PORTAL LINK FT
You can access the FollowMyHealth Patient Portal offered by [facility] by registering at the following website: [email or web address]. By joining [facility]’s FollowMyHealth portal, you will also be able to view your health information using other applications (apps) compatible with our system.

## 2022-11-21 NOTE — ED PROVIDER NOTE - CARE PROVIDER_API CALL
Osbaldo Pope)  Internal Medicine  [address] [place] [address]  Phone: [phone]  Fax: [phone]  Follow Up Time:

## 2022-11-21 NOTE — ED PROVIDER NOTE - NSFOLLOWUPINSTRUCTIONS_ED_ALL_ED_FT
REST  STAY WELL HYDRATED  AVOID STIMULANTS LIKE CAFFEINE NO SMOKING  FOLLOW UP WITH YOUR PRIMARY CARE DOCTOR   FOLLOW UP WITH CARDIOLOGY DR LEILA ALVARADO  NO VAPING      Heart Palpitations    WHAT YOU NEED TO KNOW:    Heart palpitations are feelings that your heart races, jumps, throbs, or flutters. You may feel extra beats, no beats for a short time, or skipped beats. You may have these feelings in your chest, throat, or neck. They may happen when you are sitting, standing, or lying. Heart palpitations may be frightening, but are usually not caused by a serious problem.     DISCHARGE INSTRUCTIONS:    Call 911 or have someone else call for any of the following:   •You have any of the following signs of a heart attack: ?Squeezing, pressure, or pain in your chest      ?You may also have any of the following: ?Discomfort or pain in your back, neck, jaw, stomach, or arm      ?Shortness of breath      ?Nausea or vomiting      ?Lightheadedness or a sudden cold sweat        •You have any of the following signs of a stroke: ?Numbness or drooping on one side of your face       ?Weakness in an arm or leg      ?Confusion or difficulty speaking      ?Dizziness, a severe headache, or vision loss      •You faint or lose consciousness.       Return to the emergency department if:   •Your palpitations happen more often or get more intense.           Contact your healthcare provider if:   •You have new or worsening swelling in your feet or ankles.      •You have questions or concerns about your condition or care.      Follow up with your healthcare provider as directed: You may need to follow up with a cardiologist. You may need tests to check for heart problems that cause palpitations. Write down your questions so you remember to ask them during your visits.     Keep a record: Write down when your palpitations start and stop, what you were doing when they started, and your symptoms. Keep track of what you ate or drank within a few hours of your palpitations. Include anything that seemed to help your symptoms, such as lying down or holding your breath. This record will help you and your healthcare provider learn what triggers your palpitations. Bring this record with you to your follow up visits.    Help prevent heart palpitations:   •Manage stress and anxiety. Find ways to relax such as listening to music, meditating, or doing yoga. Exercise can also help decrease stress and anxiety. Talk to someone you trust about your stress or anxiety. You can also talk to a therapist.       •Get plenty of sleep every night. Ask your healthcare provider how much sleep you need each night.       •Do not drink caffeine or alcohol. Caffeine and alcohol can make your palpitations worse. Caffeine is found in soda, coffee, tea, chocolate, and drinks that increase your energy.       •Do not smoke. Nicotine and other chemicals in cigarettes and cigars may damage your heart and blood vessels. Ask your healthcare provider for information if you currently smoke and need help to quit. E-cigarettes or smokeless tobacco still contain nicotine. Talk to your healthcare provider before you use these products.       •Do not use illegal drugs. Talk to your healthcare provider if you use illegal drugs and want help to quit.

## 2022-11-21 NOTE — ED PROVIDER NOTE - OBJECTIVE STATEMENT
23 yo female with PMHx of asthma, anxiety, depression, borderline personality disorder, audiological/visual hallucinations, borderline schizophrenia presents to the ED for episodes of palpitations and chest pain x1 week. States that she experiences 4-5 episodes a day and episodes usually occur during positional changes. Elicits associated nausea, chest pain/pressure, light-headedness, and dizziness. Pain radiates into the neck. Episodes last 1-3 minutes and subside when she lays down and doesn't move. Pt has checked her heart rate at home during these episodes and states that it will go as high as 170 bpm. Pt states that she has also been experiencing GERD symptoms x1 week which have been new for her. No recent travel or sick contacts. Has never experienced these episodes in the past. Pt elicits no pertinent fam hx. States that she vapes nicotine 1-2x per day. Has been taking Seroquel 400 mg qd at bedtime for the last 3 yrs.

## 2022-11-21 NOTE — ED PROVIDER NOTE - NS ED ATTENDING STATEMENT MOD
I have seen and examined this patient and fully participated in the care of this patient as the teaching attending.  The service was shared with the MONAE.  I reviewed and verified the documentation and independently performed the documented:

## 2022-11-21 NOTE — ED ADULT NURSE NOTE - OBJECTIVE STATEMENT
Patient is a 23yo female complaining of palpitations as well as left sided chest pain with shortness of breath Patient was seen at [facility] for same chief complaint 4 days ago but left before being seen by a doctor. Patient  in no acute distress at this time. Patient  denies fever or chills nausea vomiting diarrhea or sick contacts

## 2022-11-21 NOTE — ED PROVIDER NOTE - PHYSICAL EXAMINATION
T(C): 36.5 (11-21-22 @ 09:31), Max: 36.5 (11-21-22 @ 09:31)  HR: 104 (11-21-22 @ 09:31) (104 - 104)  BP: 119/77 (11-21-22 @ 09:31) (119/77 - 119/77)  RR: 18 (11-21-22 @ 09:31) (18 - 18)  SpO2: 99% (11-21-22 @ 09:31) (99% - 99%)    GENERAL: patient appears well, no acute distress, appropriately interactive  EYES: sclera clear, no exudates  ENMT: oropharynx clear without erythema, no exudates, moist mucous membranes  NECK: supple, soft  LUNGS: good air entry bilaterally, clear to auscultation, symmetric breath sounds, no wheezing or rhonchi appreciated  HEART: soft S1/S2, regular rate and rhythm, no murmurs noted, no lower extremity edema  GASTROINTESTINAL: abdomen is soft, nontender, nondistended, normoactive bowel sounds  INTEGUMENT: good skin turgor, warm skin, appears well perfused  MUSCULOSKELETAL: no clubbing or cyanosis, no obvious deformity  NEUROLOGIC: awake, alert, oriented x3, good muscle tone in all 4 extremities  HEME/LYMPH: no obvious ecchymosis or petechiae

## 2022-11-28 ENCOUNTER — NON-APPOINTMENT (OUTPATIENT)
Age: 22
End: 2022-11-28

## 2022-11-28 ENCOUNTER — APPOINTMENT (OUTPATIENT)
Dept: CARDIOLOGY | Facility: CLINIC | Age: 22
End: 2022-11-28
Payer: COMMERCIAL

## 2022-11-28 VITALS
DIASTOLIC BLOOD PRESSURE: 75 MMHG | WEIGHT: 134 LBS | HEART RATE: 121 BPM | BODY MASS INDEX: 21.03 KG/M2 | SYSTOLIC BLOOD PRESSURE: 121 MMHG | OXYGEN SATURATION: 99 % | HEIGHT: 67 IN

## 2022-11-28 DIAGNOSIS — R00.0 TACHYCARDIA, UNSPECIFIED: ICD-10-CM

## 2022-11-28 DIAGNOSIS — F17.200 NICOTINE DEPENDENCE, UNSPECIFIED, UNCOMPLICATED: ICD-10-CM

## 2022-11-28 DIAGNOSIS — R55 SYNCOPE AND COLLAPSE: ICD-10-CM

## 2022-11-28 PROCEDURE — 93000 ELECTROCARDIOGRAM COMPLETE: CPT

## 2022-11-28 PROCEDURE — 99204 OFFICE O/P NEW MOD 45 MIN: CPT | Mod: 25

## 2022-11-28 RX ORDER — SERTRALINE HYDROCHLORIDE 100 MG/1
100 TABLET, FILM COATED ORAL
Qty: 30 | Refills: 0 | Status: COMPLETED | COMMUNITY
Start: 2022-06-11

## 2022-11-28 RX ORDER — QUETIAPINE 400 MG/1
400 TABLET, FILM COATED ORAL
Refills: 0 | Status: ACTIVE | COMMUNITY

## 2022-11-28 RX ORDER — SERTRALINE HYDROCHLORIDE 25 MG/1
TABLET, FILM COATED ORAL
Refills: 0 | Status: DISCONTINUED | COMMUNITY
End: 2022-11-28
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
## 2022-11-28 NOTE — HISTORY OF PRESENT ILLNESS
[FreeTextEntry1] : Parisa presented to the office today for a cardiovascular evaluation.  She presents for the further evaluation of her risk factors.  She presents accompanied by her fiancé.\par \par She is now 22 years old, generally healthy from a physical perspective.  She does not have hypertension, diabetes or hyperlipidemia.  She is not known to have any underlying structural heart disease.  She does have multiple mental health issues, including anxiety, depression, PTSD and bipolar disorder.\par \par At baseline, she tries to stay physically active.  With regular physical activities, she is typically felt well, without reproducible chest discomfort or shortness of breath suggestive of angina.  She denies orthopnea, PND and lower extremity edema. \par \par Over the last several weeks, she has noted that she has been experiencing a sense of tachycardia, and forceful heart beating, with even modest activity.  She has been exhausted and short of breath at the same time.  She has noted that her heart rate has been very high at times, up to about 180 bpm.  She has been checking her blood pressure and heart rate frequently with a blood pressure monitor that in the house, and has found blood pressures in the 90s to the 120s systolic, with resting heart rates in the 110s, and elevated heart rates at other times.  She went to urgent care because of symptoms, and was referred to the emergency department.  Blood work was performed, which revealed that she was euthyroid.  She was discharged from the emergency department, and told to see a cardiologist.  She has had either near syncope or syncope at times, when her heart rate is exceptionally fast.

## 2022-12-05 ENCOUNTER — APPOINTMENT (OUTPATIENT)
Dept: CARDIOLOGY | Facility: CLINIC | Age: 22
End: 2022-12-05

## 2022-12-09 ENCOUNTER — APPOINTMENT (OUTPATIENT)
Dept: CARDIOLOGY | Facility: CLINIC | Age: 22
End: 2022-12-09

## 2023-01-04 ENCOUNTER — APPOINTMENT (OUTPATIENT)
Dept: CARDIOLOGY | Facility: CLINIC | Age: 23
End: 2023-01-04

## 2024-02-07 NOTE — ED PEDIATRIC NURSE NOTE - ILLNESS RECENT EXPOSURE
Anesthesia Post-op Note    Patient: Marco Johnston JrPuja  Procedure(s) Performed: COLONOSCOPY recall (Anus)  Anesthesia type: MAC    Vitals Value Taken Time   Temp 36.2 °C (97.2 °F) 02/07/24 1201   Pulse 55 02/07/24 1201   Resp 16 02/07/24 1201   SpO2 97 % 02/07/24 1201   /58 02/07/24 1201         Patient Location: Phase II  Post-op Vital Signs:stable  Level of Consciousness: participates in exam, answers questions appropriately, alert and awake  Respiratory Status: spontaneous ventilation  Cardiovascular blood pressure returned to baseline  Hydration: euvolemic  Pain Management: well controlled  Handoff: Handoff to receiving clinician was performed and questions were answered  Vomiting: none  Nausea: None  Airway Patency:patent  Post-op Assessment: awake, alert, appropriately conversant, or baseline, no complications, patient tolerated procedure well and no evidence of recall  Comments: Woke up very well.  Ready and appropriate for discharge.        No notable events documented.                       None known

## 2024-02-23 ENCOUNTER — APPOINTMENT (OUTPATIENT)
Dept: INTERNAL MEDICINE | Facility: CLINIC | Age: 24
End: 2024-02-23

## 2024-03-27 ENCOUNTER — NON-APPOINTMENT (OUTPATIENT)
Age: 24
End: 2024-03-27

## 2024-04-17 NOTE — ED BEHAVIORAL HEALTH ASSESSMENT NOTE - CURRENT INTENT
4/17/24, 11:40 AM EDT    Patient goals/plan/ treatment preferences discussed by  and .  Patient goals/plan/ treatment preferences reviewed with patient/ family.  Patient/ family verbalize understanding of discharge plan and are in agreement with goal/plan/treatment preferences.  Understanding was demonstrated using the teach back method.  AVS provided by RN at time of discharge, which includes all necessary medical information pertaining to the patients current course of illness, treatment, post-discharge goals of care, and treatment preferences.     Services At/After Discharge: None  Pt to be discharged to home with fiance and grandson. He denies needs or services.          None known

## 2024-11-01 ENCOUNTER — APPOINTMENT (OUTPATIENT)
Dept: INTERNAL MEDICINE | Facility: CLINIC | Age: 24
End: 2024-11-01

## 2024-11-16 ENCOUNTER — NON-APPOINTMENT (OUTPATIENT)
Age: 24
End: 2024-11-16

## 2025-01-06 NOTE — ED PROVIDER NOTE - OBJECTIVE STATEMENT
Pt is a 20 yo female hx asthma.  pt states one week ago began with body aches and fever.  pt then with cough and sob.  pt at  had rapid covid neg,   pt sx continued and now with macular pink rash on body.  pt with mild ha.  denies n/v , sputum.  pt took nebs at home with minimal relief.  pt denies a/a factors.  no covid contacts,  denies sore throat, runny eyes, or nasal congestion -3

## 2025-01-26 NOTE — ED PEDIATRIC NURSE NOTE - MEDICATION USAGE
OTOLARYNGOLOGY CONSULT NOTE    SERVICE: [facility] - Otolaryngology  RESIDENT PHYSICIANS: Tiana Wagner MD PGY5  ATTENDING PHYSICIAN: Fernando Nielsen    REASON FOR CONSULT: angiodema  CHIEF COMPLAINT:   Chief Complaint   Patient presents with    Oral Swelling     Patient  woke  up  this  morning with her  tongue swollen.  Breathing  without  difficulty in triage         HISTORY OF PRESENT ILLNESS  Paula Nicole is a 89 y.o. female with past medical history of hypertension who presents with tongue and floor of mouth swelling.  Patient reports she noticed her voice sounded a little different yesterday.  She woke up in the middle of the night and noticed her tongue was quite swollen and came to the ED.  She denies any dyspnea, stridor, dysphagia.    Of note, this is the patient's 3rd presentation in the last 2 months for angioedema symptoms.  On her 1st presentation, she was on an ACE inhibitor which was then discontinued.  However, it appears she was then put on an ARB and presented again with angioedema type symptoms.  Per patient and her family, those medicines have been discontinued.  However, on review of her medication history, an ARB was recently prescribed on the 14th of January.    REVIEW OF SYSTEMS:  ROS    As above and otherwise negative X 10-point review.    PAST MEDICAL HISTORY  Past Medical History:   Diagnosis Date    Unspecified cataract        PAST SURGICAL HISTORY  Past Surgical History:   Procedure Laterality Date    CATARACT EXTRACTION      TONSILLECTOMY         SOCIAL HISTORY  Social History     Socioeconomic History    Marital status:    Tobacco Use    Smoking status: Never    Smokeless tobacco: Never   Substance and Sexual Activity    Alcohol use: Never    Drug use: Never    Sexual activity: Never       ALLERGIES  Review of patient's allergies indicates:  No Known Allergies    FAMILY HISTORY  Family History   Problem Relation Name Age of Onset    No Known  Problems Mother      No Known Problems Father         Physical Exam  GENERAL: NAD, AAO, no dyspnea/inc WOB, no stridor, tolerates secretions   NEURO: CN II - XII exam: intact bilaterally   EYES: EOMI, PERRLA  EARS: Hearing well at normal conversation volume   AD: EAC clear, TM intact   AS: EAC clear, TM intact  NOSE: nares normal, septum midline, no rhinorrhea or bleeding  ORAL CAVITY: MMM, no lesions or ulcerations  Mild tongue and floor of mouth edema but soft  OROPHARYNX: No uvular deviation or deviation of the oropharyngeal wall noted, no erythema/ petechia/ clots; No effacement of the palatopharyngeal and/or palatoglossal folds  VOICE: communicates effectively via voicing which is slightly muffled  NECK: no asymmetry, masses, or scars, no adenopathy  CARDIOVASCULAR: peripheral pulses palpable  RESPIRATORY: non-labored respirations with symmetric chest rise, no stridor  EXT: moving with coordination  PSYCHIATRIC: orientation to time/place/person, no depression, no anxiety or agitation, mood and affect wnl    Flexible Fiberoptic Laryngoscopy/Nasopharyngoscopy (through left naris):  Anesthesia: None  Surgeon: Tiana Wagner MD    Procedure in Detail: Verbal consent was obtain form patient. Flexible laryngoscopy was performed on Paula [place] through the nasal passages. The nasal cavity, nasopharynx, oropharynx, hypopharynx and larynx were adequately visualized. The true vocal cords and arytenoids were examined during phonation and repose. Patient tolerated procedure well without complications.     Findings  NP/OP: no masses/lesions of NC, eustachian tube, fossa of Rosenmuller, no adenoid hypertrophy  BOT/vallecula: no secretions obscuring visualization, mild BOT edema  Piriform sinuses/post-cricoid: no masses/lesions, no pooling of secretions  Epiglottis: lingual surface with mild edema, laryngeal surface within normal limits  Arytenoids/FVFs: no masses/lesions, no edema, bilateral mobility  TVCs: bilateral  cord mobility, no masses or lesions  No aspiration, no pooled secretions  No sign of malignancy      LABORATORY RESULTS  Lab Results   Component Value Date/Time    WBC 6.99 06/18/2024 08:19 AM    HGB 11.5 (L) 06/18/2024 08:19 AM    HCT 36.5 (L) 06/18/2024 08:19 AM    CO2 26 06/18/2024 08:19 AM    BUN 15.0 06/18/2024 08:19 AM    CREATININE 0.74 06/18/2024 08:19 AM    GLUCOSE 103 06/18/2024 08:19 AM    CALCIUM 9.1 06/18/2024 08:19 AM    ALBUMIN 3.5 06/18/2024 08:19 AM    AST 19 06/18/2024 08:19 AM    ALT 12 06/18/2024 08:19 AM    ALKPHOS 71 06/18/2024 08:19 AM       Assessment and Plan:  Paula Nicole is a 89 y.o. female with history of HTN who presents with third episode of angioedema. Swelling limited to floor of mouth, tongue, some trace edema to lingual surface of epiglottis but overall airway is widely patent and she is stable on room air.     - Recommend admission for observation  - Decadron 8 mg Q8H, pepcid, benadryl  - Consider TXA  - Will need referral for Allergy and Immunology upon discharge  - Please call ENT with questions/concerns.      Thank you for allowing us to participate in the care of this patient.      Tiana Wagner MD  [facility] Department of Otolaryngology  HO-V     (1) Other Medications/None